# Patient Record
(demographics unavailable — no encounter records)

---

## 2018-07-05 NOTE — CT
CT CERVICAL SPINE:

7/5/18

 

Multiple axial tomograms obtained through the cervical spine with multiplanar reconstruction.

 

INDICATIONS:

Fall with injury to neck.

 

Cervical vertebrae maintain normal height and alignment. Moderate to severe degenerative changes are 
noted. Hypertrophic changes are seen throughout the cervical spine. 

 

IMPRESSION:  

Degenerative changes of the cervical spine. No acute fracture identified. 

 

POS: [facility]

## 2018-07-05 NOTE — CT
CT HEAD WITHOUT CONTRAST:

7/5/18

 

Multiple axial tomograms obtained through the head without IV enhancement. 

 

INDICATIONS:

Injury. Fall with injury to head. 

 

Comparison made to a CT head 9/24/2004.

 

Ventriculomegaly is present. Ventriculomegaly was also present on the prior exam and does not show si
gnificant change. The degree of ventriculomegaly appears slightly disproportionate to the atrophy pre
sent which raises the possibility of NPH. 

 

There is no evidence of intracranial hemorrhage. No evidence of acute infarct or mass. 

 

Mucosal thickening in the paranasal sinuses is noted. Diffuse paranasal sinus mucosal opacification w
as present on the prior exam. 

 

IMPRESSION:  

1.      No acute process or significant interval change. 

2.      There is mild ventriculomegaly as discussed above. 

 

POS: [facility]

## 2018-07-05 NOTE — RAD
RIGHT WRIST:

7/5/18

 

Three views.

 

HISTORY: 

Fall with injury to right wrist. 

 

Carpals appear normally aligned. No evidence of fracture identified.

 

There is narrowing of the radiocarpal joint and there appears to be some calcification of the triangu
lar fibrocartilage indicating chronic change. 

 

IMPRESSION:  

No acute fracture identified. 

 

POS: [facility]